# Patient Record
Sex: FEMALE | Race: WHITE | NOT HISPANIC OR LATINO | Employment: FULL TIME | URBAN - METROPOLITAN AREA
[De-identification: names, ages, dates, MRNs, and addresses within clinical notes are randomized per-mention and may not be internally consistent; named-entity substitution may affect disease eponyms.]

---

## 2017-07-22 ENCOUNTER — APPOINTMENT (OUTPATIENT)
Dept: RADIOLOGY | Facility: IMAGING CENTER | Age: 57
End: 2017-07-22
Attending: PHYSICIAN ASSISTANT
Payer: COMMERCIAL

## 2017-07-22 ENCOUNTER — OFFICE VISIT (OUTPATIENT)
Dept: URGENT CARE | Facility: CLINIC | Age: 57
End: 2017-07-22
Payer: COMMERCIAL

## 2017-07-22 VITALS
HEART RATE: 103 BPM | OXYGEN SATURATION: 98 % | HEIGHT: 65 IN | BODY MASS INDEX: 25.83 KG/M2 | SYSTOLIC BLOOD PRESSURE: 132 MMHG | DIASTOLIC BLOOD PRESSURE: 94 MMHG | WEIGHT: 155 LBS | TEMPERATURE: 98.2 F

## 2017-07-22 DIAGNOSIS — S69.92XA INJURY OF LEFT THUMB, INITIAL ENCOUNTER: ICD-10-CM

## 2017-07-22 PROCEDURE — 99203 OFFICE O/P NEW LOW 30 MIN: CPT | Performed by: PHYSICIAN ASSISTANT

## 2017-07-22 PROCEDURE — 73140 X-RAY EXAM OF FINGER(S): CPT | Mod: TC,LT | Performed by: PHYSICIAN ASSISTANT

## 2017-07-22 RX ORDER — CEFUROXIME AXETIL 500 MG/1
500 TABLET ORAL 2 TIMES DAILY
Qty: 10 TAB | Refills: 0 | Status: SHIPPED | OUTPATIENT
Start: 2017-07-22 | End: 2017-07-27

## 2017-07-22 RX ORDER — NAPROXEN 500 MG/1
500 TABLET ORAL 2 TIMES DAILY WITH MEALS
Qty: 14 TAB | Refills: 0 | Status: SHIPPED | OUTPATIENT
Start: 2017-07-22

## 2017-07-22 RX ORDER — ASPIRIN 81 MG/1
81 TABLET, CHEWABLE ORAL DAILY
COMMUNITY

## 2017-07-22 ASSESSMENT — ENCOUNTER SYMPTOMS
WEAKNESS: 1
FOCAL WEAKNESS: 1
SENSORY CHANGE: 0
NUMBNESS: 0
JOINT SWELLING: 1

## 2017-07-22 NOTE — MR AVS SNAPSHOT
"        Mila Daily   2017 12:00 PM   Office Visit   MRN: [id number]    Department:  [facility]   Dept Phone:  [phone]    Description:  Female : 1960   Provider:  Dante Cabezas PA-C           Reason for Visit     Finger Injury X today, jammed Left thumb playing basketball       Allergies as of 2017     Allergen Noted Reactions    Sulfa Drugs 2017   Hives    Hives       You were diagnosed with     Injury of left thumb, initial encounter   [332460]         Vital Signs     Blood Pressure Pulse Temperature Height Weight Body Mass Index    132/94 mmHg 103 36.8 °C (98.2 °F) 1.651 m (5' 5\") 70.308 kg (155 lb) 25.79 kg/m2    Oxygen Saturation                   98%           Basic Information     Date Of Birth Sex Race Ethnicity Preferred Language    1960 Female White Non- English      Health Maintenance     Patient has no pending health maintenance at this time      Current Immunizations     No immunizations on file.      Below and/or attached are the medications your provider expects you to take. Review all of your home medications and newly ordered medications with your provider and/or pharmacist. Follow medication instructions as directed by your provider and/or pharmacist. Please keep your medication list with you and share with your provider. Update the information when medications are discontinued, doses are changed, or new medications (including over-the-counter products) are added; and carry medication information at all times in the event of emergency situations     Allergies:  SULFA DRUGS - Hives               Medications  Valid as of: 2017 -  1:19 PM    Generic Name Brand Name Tablet Size Instructions for use    Acetaminophen-Codeine (Tab) TYLENOL #3 300-30 MG Take 1-2 Tabs by mouth every four hours as needed for Mild Pain or Moderate Pain.        Aspirin (Chew Tab) ASA 81 MG Take 81 mg by mouth every day.        Cefuroxime Axetil (Tab) CEFTIN " 500 MG Take 1 Tab by mouth 2 times a day for 5 days.        Naproxen (Tab) NAPROSYN 500 MG Take 1 Tab by mouth 2 times a day, with meals.        .                 Medicines prescribed today were sent to:     [facility] #[id number] - [place], [place] - [address]     [address]  [place] [address]    Phone: [phone] Fax: [phone]    Open 24 Hours?: No      Medication refill instructions:       If your prescription bottle indicates you have medication refills left, it is not necessary to call your provider’s office. Please contact your pharmacy and they will refill your medication.    If your prescription bottle indicates you do not have any refills left, you may request refills at any time through one of the following ways: The online Boond system (except Urgent Care), by calling your provider’s office, or by asking your pharmacy to contact your provider’s office with a refill request. Medication refills are processed only during regular business hours and may not be available until the next business day. Your provider may request additional information or to have a follow-up visit with you prior to refilling your medication.   *Please Note: Medication refills are assigned a new Rx number when refilled electronically. Your pharmacy may indicate that no refills were authorized even though a new prescription for the same medication is available at the pharmacy. Please request the medicine by name with the pharmacy before contacting your provider for a refill.        Referral     A referral request has been sent to our patient care coordination department. Please allow 3-5 business days for us to process this request and contact you either by phone or mail. If you do not hear from us by the 5th business day, please call us at [phone].           Boond Access Code: [email or web address]  Expires: 8/21/2017  1:19 PM    Boond  A secure, online tool to manage your health information     [employer] is  a secure, online tool that connects you to your personalized health information from the privacy of your home -- day or night - making it very easy for you to manage your healthcare. Once the activation process is completed, you can even access your medical information using the [employer] karen, which is available for free in the Apple Karen store or Google Play store.     [employer] provides the following levels of access (as shown below):   My Chart Features   [facility] Doctor [facility]  Specialists [facility]  Urgent  Care Non-[facility]  Primary Care  Doctor   Email your healthcare team securely and privately 24/7 X X X    Manage appointments: schedule your next appointment; view details of past/upcoming appointments X      Request prescription refills. X      View recent personal medical records, including lab and immunizations X X X X   View health record, including health history, allergies, medications X X X X   Read reports about your outpatient visits, procedures, consult and ER notes X X X X   See your discharge summary, which is a recap of your hospital and/or ER visit that includes your diagnosis, lab results, and care plan. X X       How to register for [employer]:  1. Go to  [email or web address].  2. Click on the Sign Up Now box, which takes you to the New Member Sign Up page. You will need to provide the following information:  a. Enter your [employer] Access Code exactly as it appears at the top of this page. (You will not need to use this code after you’ve completed the sign-up process. If you do not sign up before the expiration date, you must request a new code.)   b. Enter your date of birth.   c. Enter your home email address.   d. Click Submit, and follow the next screen’s instructions.  3. Create a [employer] ID. This will be your [employer] login ID and cannot be changed, so think of one that is secure and easy to remember.  4. Create a [employer] password. You can change your password at any time.  5. Enter  your Password Reset Question and Answer. This can be used at a later time if you forget your password.   6. Enter your e-mail address. This allows you to receive e-mail notifications when new information is available in [employer] [facility].  7. Click Sign Up. You can now view your health information.    For assistance activating your [employer] account, call [phone]

## 2017-07-22 NOTE — PROGRESS NOTES
"Subjective:      Mila Daily is a 57 y.o. female who presents with Finger Injury            Other  This is a new problem. The current episode started today (jammed L thumb). The problem occurs constantly. The problem has been unchanged. Associated symptoms include joint swelling and weakness. Pertinent negatives include no numbness. The symptoms are aggravated by bending. She has tried rest for the symptoms. The treatment provided no relief.       Review of Systems   Musculoskeletal: Positive for joint pain and joint swelling.   Skin: Negative.    Neurological: Positive for focal weakness and weakness. Negative for sensory change and numbness.          Objective:     /94 mmHg  Pulse 103  Temp(Src) 36.8 °C (98.2 °F)  Ht 1.651 m (5' 5\")  Wt 70.308 kg (155 lb)  BMI 25.79 kg/m2  SpO2 98%     Physical Exam   Constitutional: She is oriented to person, place, and time. She appears well-developed and well-nourished. No distress.   Musculoskeletal: She exhibits tenderness (ttp IP/dist phal; +subung.hemorrh.; relieved w/trephination).   Neurological: She is alert and oriented to person, place, and time. No sensory deficit. She exhibits abnormal muscle tone. Coordination normal.   Skin: Skin is warm and dry. No erythema.   Psychiatric: She has a normal mood and affect. Her behavior is normal. Judgment and thought content normal.   Nursing note and vitals reviewed.       xr= +fx dist phal   (read/interpret. By me. Rw)         Assessment/Plan:     1. Injury of left thumb, initial encounter  Comminuted fx distal thumb     - REFERRAL TO HAND SURGERY  - cefUROXime (CEFTIN) 500 MG Tab; Take 1 Tab by mouth 2 times a day for 5 days.  Dispense: 10 Tab; Refill: 0  - naproxen (NAPROSYN) 500 MG Tab; Take 1 Tab by mouth 2 times a day, with meals.  Dispense: 14 Tab; Refill: 0  - acetaminophen-codeine #3 (TYLENOL #3) 300-30 MG Tab; Take 1-2 Tabs by mouth every four hours as needed for Mild Pain or Moderate Pain.  " Dispense: 12 Tab; Refill: 0

## 2025-01-27 ENCOUNTER — APPOINTMENT (OUTPATIENT)
Dept: URGENT CARE | Facility: CLINIC | Age: 65
End: 2025-01-27
Payer: MEDICARE